# Patient Record
Sex: MALE | Race: WHITE | NOT HISPANIC OR LATINO | Employment: UNEMPLOYED | URBAN - METROPOLITAN AREA
[De-identification: names, ages, dates, MRNs, and addresses within clinical notes are randomized per-mention and may not be internally consistent; named-entity substitution may affect disease eponyms.]

---

## 2024-06-17 ENCOUNTER — TRANSFERRED RECORDS (OUTPATIENT)
Dept: HEALTH INFORMATION MANAGEMENT | Facility: CLINIC | Age: 44
End: 2024-06-17
Payer: COMMERCIAL

## 2024-06-22 ENCOUNTER — MEDICAL CORRESPONDENCE (OUTPATIENT)
Dept: HEALTH INFORMATION MANAGEMENT | Facility: CLINIC | Age: 44
End: 2024-06-22

## 2024-06-22 ENCOUNTER — APPOINTMENT (OUTPATIENT)
Dept: MRI IMAGING | Facility: CLINIC | Age: 44
End: 2024-06-22
Attending: EMERGENCY MEDICINE
Payer: COMMERCIAL

## 2024-06-22 ENCOUNTER — HOSPITAL ENCOUNTER (EMERGENCY)
Facility: CLINIC | Age: 44
Discharge: HOME | End: 2024-06-22
Attending: EMERGENCY MEDICINE | Admitting: EMERGENCY MEDICINE
Payer: COMMERCIAL

## 2024-06-22 VITALS
HEART RATE: 63 BPM | RESPIRATION RATE: 16 BRPM | WEIGHT: 250 LBS | SYSTOLIC BLOOD PRESSURE: 114 MMHG | TEMPERATURE: 97.8 F | BODY MASS INDEX: 33.86 KG/M2 | HEIGHT: 72 IN | DIASTOLIC BLOOD PRESSURE: 76 MMHG | OXYGEN SATURATION: 99 %

## 2024-06-22 DIAGNOSIS — H53.8 BLURRED VISION: ICD-10-CM

## 2024-06-22 DIAGNOSIS — H49.22 LEFT ABDUCENS NERVE PALSY: ICD-10-CM

## 2024-06-22 LAB
ANION GAP SERPL CALCULATED.3IONS-SCNC: 13 MMOL/L (ref 7–15)
BASOPHILS # BLD AUTO: 0.1 10E3/UL (ref 0–0.2)
BASOPHILS NFR BLD AUTO: 1 %
BUN SERPL-MCNC: 9.5 MG/DL (ref 6–20)
CALCIUM SERPL-MCNC: 9.6 MG/DL (ref 8.6–10)
CHLORIDE SERPL-SCNC: 104 MMOL/L (ref 98–107)
CREAT SERPL-MCNC: 0.92 MG/DL (ref 0.67–1.17)
DEPRECATED HCO3 PLAS-SCNC: 24 MMOL/L (ref 22–29)
EGFRCR SERPLBLD CKD-EPI 2021: >90 ML/MIN/1.73M2
EOSINOPHIL # BLD AUTO: 0.1 10E3/UL (ref 0–0.7)
EOSINOPHIL NFR BLD AUTO: 2 %
ERYTHROCYTE [DISTWIDTH] IN BLOOD BY AUTOMATED COUNT: 13.4 % (ref 10–15)
GLUCOSE SERPL-MCNC: 98 MG/DL (ref 70–99)
HCT VFR BLD AUTO: 46.3 % (ref 40–53)
HGB BLD-MCNC: 15.8 G/DL (ref 13.3–17.7)
IMM GRANULOCYTES # BLD: 0.1 10E3/UL
IMM GRANULOCYTES NFR BLD: 1 %
LYMPHOCYTES # BLD AUTO: 1.8 10E3/UL (ref 0.8–5.3)
LYMPHOCYTES NFR BLD AUTO: 23 %
MAGNESIUM SERPL-MCNC: 2.1 MG/DL (ref 1.7–2.3)
MCH RBC QN AUTO: 29.6 PG (ref 26.5–33)
MCHC RBC AUTO-ENTMCNC: 34.1 G/DL (ref 31.5–36.5)
MCV RBC AUTO: 87 FL (ref 78–100)
MONOCYTES # BLD AUTO: 0.6 10E3/UL (ref 0–1.3)
MONOCYTES NFR BLD AUTO: 8 %
NEUTROPHILS # BLD AUTO: 5.2 10E3/UL (ref 1.6–8.3)
NEUTROPHILS NFR BLD AUTO: 67 %
NRBC # BLD AUTO: 0 10E3/UL
NRBC BLD AUTO-RTO: 0 /100
PLATELET # BLD AUTO: 269 10E3/UL (ref 150–450)
POTASSIUM SERPL-SCNC: 4.2 MMOL/L (ref 3.4–5.3)
RBC # BLD AUTO: 5.34 10E6/UL (ref 4.4–5.9)
SODIUM SERPL-SCNC: 141 MMOL/L (ref 135–145)
WBC # BLD AUTO: 7.8 10E3/UL (ref 4–11)

## 2024-06-22 PROCEDURE — 255N000002 HC RX 255 OP 636: Performed by: EMERGENCY MEDICINE

## 2024-06-22 PROCEDURE — 62270 DX LMBR SPI PNXR: CPT

## 2024-06-22 PROCEDURE — 70553 MRI BRAIN STEM W/O & W/DYE: CPT

## 2024-06-22 PROCEDURE — 99285 EMERGENCY DEPT VISIT HI MDM: CPT | Mod: 25

## 2024-06-22 PROCEDURE — 96374 THER/PROPH/DIAG INJ IV PUSH: CPT | Mod: 59

## 2024-06-22 PROCEDURE — 85025 COMPLETE CBC W/AUTO DIFF WBC: CPT | Performed by: EMERGENCY MEDICINE

## 2024-06-22 PROCEDURE — 70544 MR ANGIOGRAPHY HEAD W/O DYE: CPT

## 2024-06-22 PROCEDURE — 83735 ASSAY OF MAGNESIUM: CPT | Performed by: EMERGENCY MEDICINE

## 2024-06-22 PROCEDURE — 80048 BASIC METABOLIC PNL TOTAL CA: CPT | Performed by: EMERGENCY MEDICINE

## 2024-06-22 PROCEDURE — A9585 GADOBUTROL INJECTION: HCPCS | Performed by: EMERGENCY MEDICINE

## 2024-06-22 PROCEDURE — 36415 COLL VENOUS BLD VENIPUNCTURE: CPT | Performed by: EMERGENCY MEDICINE

## 2024-06-22 PROCEDURE — 250N000011 HC RX IP 250 OP 636: Mod: JZ | Performed by: EMERGENCY MEDICINE

## 2024-06-22 PROCEDURE — 70549 MR ANGIOGRAPH NECK W/O&W/DYE: CPT

## 2024-06-22 RX ORDER — LORAZEPAM 2 MG/ML
1 INJECTION INTRAMUSCULAR ONCE
Status: COMPLETED | OUTPATIENT
Start: 2024-06-22 | End: 2024-06-22

## 2024-06-22 RX ORDER — LIDOCAINE HYDROCHLORIDE 10 MG/ML
INJECTION, SOLUTION EPIDURAL; INFILTRATION; INTRACAUDAL; PERINEURAL
Status: DISCONTINUED
Start: 2024-06-22 | End: 2024-06-23 | Stop reason: HOSPADM

## 2024-06-22 RX ORDER — GADOBUTROL 604.72 MG/ML
10 INJECTION INTRAVENOUS ONCE
Status: COMPLETED | OUTPATIENT
Start: 2024-06-22 | End: 2024-06-22

## 2024-06-22 RX ORDER — LIDOCAINE HYDROCHLORIDE AND EPINEPHRINE 10; 10 MG/ML; UG/ML
INJECTION, SOLUTION INFILTRATION; PERINEURAL
Status: DISCONTINUED
Start: 2024-06-22 | End: 2024-06-23 | Stop reason: HOSPADM

## 2024-06-22 RX ADMIN — LORAZEPAM 1 MG: 2 INJECTION INTRAMUSCULAR; INTRAVENOUS at 18:01

## 2024-06-22 RX ADMIN — GADOBUTROL 10 ML: 604.72 INJECTION INTRAVENOUS at 18:12

## 2024-06-22 ASSESSMENT — ACTIVITIES OF DAILY LIVING (ADL)
ADLS_ACUITY_SCORE: 35

## 2024-06-22 ASSESSMENT — COLUMBIA-SUICIDE SEVERITY RATING SCALE - C-SSRS
2. HAVE YOU ACTUALLY HAD ANY THOUGHTS OF KILLING YOURSELF IN THE PAST MONTH?: NO
6. HAVE YOU EVER DONE ANYTHING, STARTED TO DO ANYTHING, OR PREPARED TO DO ANYTHING TO END YOUR LIFE?: NO
1. IN THE PAST MONTH, HAVE YOU WISHED YOU WERE DEAD OR WISHED YOU COULD GO TO SLEEP AND NOT WAKE UP?: NO

## 2024-06-22 NOTE — ED TRIAGE NOTES
Pt presents for complaint of right eye blurred vision. Pt states vision is blurry only when gazing to the right for x2 weeks. States vision is otherwise normal. Describes some balance issues when looking to the right as well. Seen at optho clinic and sent in for evaluation. Eye tracking devolves with a right-ireland gaze.ABC intact, A&Ox4.     Triage Assessment (Adult)       Row Name 06/22/24 3990          Triage Assessment    Airway WDL WDL        Respiratory WDL    Respiratory WDL WDL        Skin Circulation/Temperature WDL    Skin Circulation/Temperature WDL WDL        Cardiac WDL    Cardiac WDL WDL        Peripheral/Neurovascular WDL    Peripheral Neurovascular WDL WDL        Cognitive/Neuro/Behavioral WDL    Cognitive/Neuro/Behavioral WDL WDL

## 2024-06-22 NOTE — ED PROVIDER NOTES
"Emergency Department Note      History of Present Illness     Chief Complaint:  Eye Problem      The history is provided by the patient and a relative.      Bill Gorman is a 43 year old male without any significant medical history who presents with blurry vision only when he looks to the right that started a few weeks ago. Both eyes seem to be affected. Otherwise vision is perceived as normal. He has also noted that for the last 2 weeks, when he looks to the left, he gets nauseated.    He saw an eye doctor today for the above symptoms. They checked his eye pressures and performed an eye exam. He was told to come to the ED.     He also notes a generalized HA for 2 months (initially intermittent). It always seemed worse in the AM and was associated with nausea. Some times Tylenol would help. Other times the HA would spontaneously improve. No recent F/C. No Hx HTN. He notes shortness of breath that alleviates on its own. No focal weakness, tachycardia, or feeling feverish but he mentioned that he had surgery on his right knee recently and perceived right leg \"instability\", but thinks that might he due to the surgery. He also perceives some left leg weakness over 1.5-2 months but denies feeling any leg weakness today. He denies any history of hypertension and is currently not taking any medications. Notably, he feels unbalanced when he walks up the stairs or looks to the right.     Independent Historian:    The patient and a relative present during examination endorses the information above.    Review of External Notes  Reviewed documents from [facility] in [place]. Concerned for 6th nerve palsy. Un-dilated eye exam, unremarkable. Referred to ED for further workup.     Past Medical History   Medical History, Surgical History, Problem List, and Medications  Reviewed in Epic    Physical Exam   Patient Vitals for the past 24 hrs:   BP Temp Pulse Resp SpO2 Height Weight   06/22/24 2200 -- -- -- -- 99 % -- -- "   06/22/24 2158 -- -- -- -- 99 % -- --   06/22/24 2157 -- -- -- -- 92 % -- --   06/22/24 2130 114/76 -- -- -- 95 % -- --   06/22/24 2100 101/59 -- 63 -- 98 % -- --   06/22/24 2030 124/85 -- 64 -- 97 % -- --   06/22/24 2000 128/84 -- 82 -- 99 % -- --   06/22/24 1946 -- -- -- -- 99 % -- --   06/22/24 1945 122/77 -- 67 -- 100 % -- --   06/22/24 1810 -- -- -- -- 100 % -- --   06/22/24 1805 -- -- -- -- 99 % -- --   06/22/24 1800 113/86 -- 50 -- 98 % -- --   06/22/24 1745 -- -- -- -- 98 % -- --   06/22/24 1730 -- -- -- -- 98 % -- --   06/22/24 1715 112/68 -- -- -- 99 % -- --   06/22/24 1708 -- -- -- -- 97 % -- --   06/22/24 1707 -- -- -- -- 99 % -- --   06/22/24 1706 -- -- -- -- 99 % -- --   06/22/24 1704 -- -- -- -- 97 % -- --   06/22/24 1703 -- -- -- -- 98 % -- --   06/22/24 1702 -- -- -- -- 99 % -- --   06/22/24 1640 -- -- -- -- 100 % -- --   06/22/24 1635 -- -- -- -- 99 % -- --   06/22/24 1630 -- -- -- -- 98 % -- --   06/22/24 1625 116/68 -- 51 -- 100 % -- --   06/22/24 1534 -- -- -- -- 100 % -- --   06/22/24 1532 -- -- -- -- 100 % -- --   06/22/24 1530 -- -- -- -- 100 % -- --   06/22/24 1529 -- -- -- -- 100 % -- --   06/22/24 1520 -- -- -- -- 99 % -- --   06/22/24 1510 -- -- -- -- 99 % -- --   06/22/24 1500 127/84 -- -- -- 100 % -- --   06/22/24 1416 (!) 163/95 97.8  F (36.6  C) 78 16 99 % 1.829 m (6') 113.4 kg (250 lb)       Physical Exam  Constitutional: Vital signs reviewed as above.   Eyes: PEERL, EOMI B/L  Neck: No JVD noted. FROM   Cardiovascular: normal rate, Regular rhythm and normal heart sounds.  No murmur heard. Equal B/L peripheral pulses.  Pulmonary/Chest: Effort normal and breath sounds normal. No respiratory distress. Patient has no wheezes. Patient has no rales.   Gastrointestinal: Soft. There is no tenderness.   Musculoskeletal/Extremities: No pitting edema noted. Normal tone.  Neurological:    No nystagmus noted  Patient is alert and oriented to person, place, and time.    Speech is fluent,  cognition is normal.   CN 2-12 intact (PERRL, EOMI, symmetric smile, equal eye squeeze and forehead raise, normal and equal sensation to bilateral forehead/cheek/chin, grossly equal hearing B/L, midline tongue protrusion with nl side-to-side movement, normal shoulder shrug).    RUE strength 5/5: , finger abd, wrist flex/ext, elbow flex/ext.    LUE strength 5/5: , finger abd, wrist flex/ext, elbow flex/ext.    RLE strength 5/5: ankle flex/ext, knee flex/ext, hip flex.    LLE strength 5/5: ankle flex/ext, knee flex/ext, hip flex.    Sensation equal in all 4 extremities.    No arm drift.     Cerebellar: Normal rapid alternating movements     ( finger-nose-finger, rapid pronation/supination, hand rolling)    Normal heel-to-shin   Skin: Skin is warm and dry. There is no diaphoresis noted.   Psychiatric: The patient appears calm.     Diagnostics     Laboratory: Imaging:   Labs Ordered and Resulted from Time of ED Arrival to Time of ED Departure   BASIC METABOLIC PANEL - Normal       Result Value    Sodium 141      Potassium 4.2      Chloride 104      Carbon Dioxide (CO2) 24      Anion Gap 13      Urea Nitrogen 9.5      Creatinine 0.92      GFR Estimate >90      Calcium 9.6      Glucose 98     MAGNESIUM - Normal    Magnesium 2.1     CBC WITH PLATELETS AND DIFFERENTIAL    WBC Count 7.8      RBC Count 5.34      Hemoglobin 15.8      Hematocrit 46.3      MCV 87      MCH 29.6      MCHC 34.1      RDW 13.4      Platelet Count 269      % Neutrophils 67      % Lymphocytes 23      % Monocytes 8      % Eosinophils 2      % Basophils 1      % Immature Granulocytes 1      NRBCs per 100 WBC 0      Absolute Neutrophils 5.2      Absolute Lymphocytes 1.8      Absolute Monocytes 0.6      Absolute Eosinophils 0.1      Absolute Basophils 0.1      Absolute Immature Granulocytes 0.1      Absolute NRBCs 0.0       MR Brain w/o & w Contrast   Final Result   IMPRESSION:   HEAD MRI:   1. No acute intracranial abnormality. No evidence of  acute infarct, hemorrhage, or mass.   2. Multifocal scattered nonspecific white matter foci of signal abnormality. Primary differential considerations are demyelinating disease versus chronic small vessel ischemic disease which should be advanced for the patient's age.      HEAD MRA:   Normal MRA [place].      NECK MRA:   Normal neck MRA.         MRA Angiogram Head w/o Contrast   Final Result   IMPRESSION:   HEAD MRI:   1. No acute intracranial abnormality. No evidence of acute infarct, hemorrhage, or mass.   2. Multifocal scattered nonspecific white matter foci of signal abnormality. Primary differential considerations are demyelinating disease versus chronic small vessel ischemic disease which should be advanced for the patient's age.      HEAD MRA:   Normal MRA [place].      NECK MRA:   Normal neck MRA.         MRA Angiogram Neck w/o & w Contrast   Final Result   IMPRESSION:   HEAD MRI:   1. No acute intracranial abnormality. No evidence of acute infarct, hemorrhage, or mass.   2. Multifocal scattered nonspecific white matter foci of signal abnormality. Primary differential considerations are demyelinating disease versus chronic small vessel ischemic disease which should be advanced for the patient's age.      HEAD MRA:   Normal MRA [place].      NECK MRA:   Normal neck MRA.                 Independent Interpretation  See ED course    ED Course    Medications Administered  Medications   lidocaine 1% with EPINEPHrine 1:100,000 1 %-1:[id number] injection (has no administration in time range)   lidocaine (PF) (XYLOCAINE) 1 % injection (has no administration in time range)   LORazepam (ATIVAN) injection 1 mg (1 mg Intravenous $Given 6/22/24 1801)   gadobutrol (GADAVIST) injection 10 mL (10 mLs Intravenous $Given 6/22/24 1812)   sodium chloride (PF) 0.9% PF flush 60 mL (60 mLs Intravenous $Given 6/22/24 1813)       Procedures  [facility]    -Lumbar Puncture    Date/Time:  6/23/2024 12:07 AM    Performed by: Fei Duarte DO  Authorized by: Fei Duarte DO    Risks, benefits and alternatives discussed.      UNIVERSAL PROTOCOL   Site Marked: NA  Prior Images Obtained and Reviewed:  NA  Required items: Required blood products, implants, devices and special equipment available    Patient identity confirmed:  Verbally with patient, arm band, provided demographic data and hospital-assigned identification number  NA - No sedation, light sedation, or local anesthesia  Confirmation Checklist:  Patient's identity using two indicators, relevant allergies, procedure was appropriate and matched the consent or emergent situation and correct equipment/implants were available  Time out: Immediately prior to the procedure a time out was called    Universal Protocol: the Joint Commission Universal Protocol was followed    Preparation: Patient was prepped and draped in usual sterile fashion      PRE-PROCEDURE DETAILS:     Procedure purpose:  Diagnostic    ANESTHESIA (see MAR for exact dosages):     Anesthesia method:  Local infiltration    Local anesthetic:  Lidocaine 1% WITH epi    PROCEDURE DETAILS:     Lumbar space:  L3-L4 interspace    Patient position:  L lateral decubitus    Needle gauge:  20    Needle type:  Spinal needle - Quincke tip    Needle length (in):  3.5    Ultrasound guidance: no      Number of attempts:  4    POST-PROCEDURE:     Puncture site:  Adhesive bandage applied      PROCEDURE  Describe Procedure: Despite multiple attempts, we were unable to obtain cerebrospinal fluid.  The attempt was aborted.  Patient Tolerance:  Patient tolerated the procedure well with no immediate complications       Discussion of Management  See ED Course    Social Determinants of Health adding to complexity of care  None    ED Course  ED Course as of 06/23/24 0008   Sat Jun 22, 2024   1527 Initial evaluation   1930 Rechecked and updated.   1938 D/W Dr. Watkins ([facility]  Neurology). Can look at doing LP.    1951 Rechecked and updated,.   1952 Rechecked and updated.   2023 Rechecked and updated.       Medical Decision Making / Diagnosis   CMS Diagnoses: None    Code Status: No Order    MIPS     None    Medical Decision Making:  This 43-year-old presents to the ED due to blurry vision and headache.  Please see the HPI and exam for specifics.  A broad differential was considered.  The patient's most notable physical exam abnormality was notably increased adduction of the left eye.  I think this misalignment is likely what is leading to the blurry vision he is perceiving.  Due to this as well as the couple months of headaches, MRI imaging was ordered.  Fortunately no brain mass nor stroke was seen.  There was some nonspecific findings that were noted.  After talking with neurology, consideration for idiopathic intracranial hypertension was given even though the patient has no systemic hypertension.  After obtaining informed consent, lumbar puncture was attempted but we were unsuccessful.  The attempt was aborted and I think we should have the patient follow-up with neurology in the outpatient setting.  He was comfortable with this plan and will follow-up as noted.  Referral paperwork sent to [facility], [facility].    Critical Care:  None.    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 Blurred vision  H53.8            Discharge Medications:  Discharge Medication List as of 6/22/2024 11:22 PM         6/22/2024   Fei Duarte DO     [employer]     Scribe Disclosure:  Fallon TREVINO, am serving as a scribe at 4:07 PM on 6/22/2024 to document services personally performed by Fei Duarte DO based on my observations and the provider's statements to me.     Scribe Disclosure:  Kerri TREVINO, am serving as the scribe  for Ivy  Magalys the scribe trainee, at 4:21 PM on 6/22/2024 to document services personally performed by Fei Duarte DO based on our observations and the provider's statements to us.                 Fei Duarte DO  06/23/24 0008

## 2024-06-23 NOTE — DISCHARGE INSTRUCTIONS
What do you do next:   Continue your home medications unless we have specifically changed them  The neurology clinic will contact you about outpatient follow-up.  You can call the ophthalmology clinic I have listed on the paperwork for follow-up as well  You can use over-the-counter acetaminophen (Tylenol ) and ibuprofen for fever or pain control as applicable to your visit today.  Acetaminophen (Tylenol): Take 500 to 1000 mg by mouth every 6 hours as needed for fever or pain.  Do not take more than 4000 total milligrams of acetaminophen-containing products in a 24-hour timeframe.  Ibuprofen: Take 600 milligrams by mouth every 6-8 hours as needed for fever or pain.  Take this with food or milk to avoid stomach upset.  Follow up as indicated below    When do you return: Review your discharge papers for specifics on reasons to return.    Thank you for allowing us to care for you today.

## 2025-07-29 ENCOUNTER — HOSPITAL ENCOUNTER (EMERGENCY)
Facility: CLINIC | Age: 45
Discharge: HOME | End: 2025-07-29
Attending: STUDENT IN AN ORGANIZED HEALTH CARE EDUCATION/TRAINING PROGRAM
Payer: COMMERCIAL

## 2025-07-29 ENCOUNTER — APPOINTMENT (OUTPATIENT)
Dept: GENERAL RADIOLOGY | Facility: CLINIC | Age: 45
End: 2025-07-29
Attending: STUDENT IN AN ORGANIZED HEALTH CARE EDUCATION/TRAINING PROGRAM
Payer: COMMERCIAL

## 2025-07-29 VITALS
WEIGHT: 208 LBS | HEART RATE: 62 BPM | OXYGEN SATURATION: 98 % | DIASTOLIC BLOOD PRESSURE: 72 MMHG | SYSTOLIC BLOOD PRESSURE: 131 MMHG | TEMPERATURE: 97.6 F | HEIGHT: 72 IN | BODY MASS INDEX: 28.17 KG/M2 | RESPIRATION RATE: 16 BRPM

## 2025-07-29 DIAGNOSIS — R20.2 PARESTHESIAS: ICD-10-CM

## 2025-07-29 DIAGNOSIS — F12.90 MARIJUANA USE: ICD-10-CM

## 2025-07-29 DIAGNOSIS — F17.200 TOBACCO USE DISORDER: ICD-10-CM

## 2025-07-29 DIAGNOSIS — R06.02 SHORTNESS OF BREATH: Primary | ICD-10-CM

## 2025-07-29 PROBLEM — F40.10 SOCIAL ANXIETY DISORDER: Status: ACTIVE | Noted: 2023-12-22

## 2025-07-29 LAB
ANION GAP SERPL CALCULATED.3IONS-SCNC: 20 MMOL/L (ref 7–15)
BASOPHILS # BLD AUTO: 0 10E3/UL (ref 0–0.2)
BASOPHILS NFR BLD AUTO: 0 %
BUN SERPL-MCNC: 14.1 MG/DL (ref 6–20)
CALCIUM SERPL-MCNC: 9.4 MG/DL (ref 8.8–10.4)
CHLORIDE SERPL-SCNC: 98 MMOL/L (ref 98–107)
CREAT SERPL-MCNC: 0.83 MG/DL (ref 0.67–1.17)
D DIMER PPP FEU-MCNC: <0.27 UG/ML FEU (ref 0–0.5)
EGFRCR SERPLBLD CKD-EPI 2021: >90 ML/MIN/1.73M2
EOSINOPHIL # BLD AUTO: 0.1 10E3/UL (ref 0–0.7)
EOSINOPHIL NFR BLD AUTO: 2 %
ERYTHROCYTE [DISTWIDTH] IN BLOOD BY AUTOMATED COUNT: 14.2 % (ref 10–15)
GLUCOSE SERPL-MCNC: 99 MG/DL (ref 70–99)
HCO3 SERPL-SCNC: 19 MMOL/L (ref 22–29)
HCT VFR BLD AUTO: 42.4 % (ref 40–53)
HGB BLD-MCNC: 14.4 G/DL (ref 13.3–17.7)
HOLD SPECIMEN: NORMAL
IMM GRANULOCYTES # BLD: 0 10E3/UL
IMM GRANULOCYTES NFR BLD: 1 %
LYMPHOCYTES # BLD AUTO: 1.4 10E3/UL (ref 0.8–5.3)
LYMPHOCYTES NFR BLD AUTO: 33 %
MCH RBC QN AUTO: 30.1 PG (ref 26.5–33)
MCHC RBC AUTO-ENTMCNC: 34 G/DL (ref 31.5–36.5)
MCV RBC AUTO: 89 FL (ref 78–100)
MONOCYTES # BLD AUTO: 0.4 10E3/UL (ref 0–1.3)
MONOCYTES NFR BLD AUTO: 10 %
NEUTROPHILS # BLD AUTO: 2.2 10E3/UL (ref 1.6–8.3)
NEUTROPHILS NFR BLD AUTO: 54 %
NRBC # BLD AUTO: 0 10E3/UL
NRBC BLD AUTO-RTO: 0 /100
PLATELET # BLD AUTO: 238 10E3/UL (ref 150–450)
POTASSIUM SERPL-SCNC: 3.6 MMOL/L (ref 3.4–5.3)
RBC # BLD AUTO: 4.79 10E6/UL (ref 4.4–5.9)
SODIUM SERPL-SCNC: 137 MMOL/L (ref 135–145)
TROPONIN T SERPL HS-MCNC: <6 NG/L
WBC # BLD AUTO: 4.1 10E3/UL (ref 4–11)

## 2025-07-29 PROCEDURE — 93005 ELECTROCARDIOGRAM TRACING: CPT

## 2025-07-29 PROCEDURE — 36415 COLL VENOUS BLD VENIPUNCTURE: CPT | Performed by: STUDENT IN AN ORGANIZED HEALTH CARE EDUCATION/TRAINING PROGRAM

## 2025-07-29 PROCEDURE — 99285 EMERGENCY DEPT VISIT HI MDM: CPT | Mod: 25 | Performed by: STUDENT IN AN ORGANIZED HEALTH CARE EDUCATION/TRAINING PROGRAM

## 2025-07-29 PROCEDURE — 85025 COMPLETE CBC W/AUTO DIFF WBC: CPT | Performed by: STUDENT IN AN ORGANIZED HEALTH CARE EDUCATION/TRAINING PROGRAM

## 2025-07-29 PROCEDURE — 80048 BASIC METABOLIC PNL TOTAL CA: CPT | Performed by: STUDENT IN AN ORGANIZED HEALTH CARE EDUCATION/TRAINING PROGRAM

## 2025-07-29 PROCEDURE — 71046 X-RAY EXAM CHEST 2 VIEWS: CPT

## 2025-07-29 PROCEDURE — 84484 ASSAY OF TROPONIN QUANT: CPT | Performed by: STUDENT IN AN ORGANIZED HEALTH CARE EDUCATION/TRAINING PROGRAM

## 2025-07-29 PROCEDURE — 85379 FIBRIN DEGRADATION QUANT: CPT | Performed by: STUDENT IN AN ORGANIZED HEALTH CARE EDUCATION/TRAINING PROGRAM

## 2025-07-29 ASSESSMENT — ACTIVITIES OF DAILY LIVING (ADL)
ADLS_ACUITY_SCORE: 41

## 2025-07-30 LAB
ATRIAL RATE - MUSE: 89 BPM
DIASTOLIC BLOOD PRESSURE - MUSE: NORMAL MMHG
INTERPRETATION ECG - MUSE: NORMAL
P AXIS - MUSE: 62 DEGREES
PR INTERVAL - MUSE: 154 MS
QRS DURATION - MUSE: 98 MS
QT - MUSE: 374 MS
QTC - MUSE: 455 MS
R AXIS - MUSE: -29 DEGREES
SYSTOLIC BLOOD PRESSURE - MUSE: NORMAL MMHG
T AXIS - MUSE: 65 DEGREES
VENTRICULAR RATE- MUSE: 89 BPM

## 2025-07-30 NOTE — ED PROVIDER NOTES
Emergency Department Note      History of Present Illness     Chief Complaint   Shortness of Breath      HPI   Bill Gorman is a very pleasant 45 year old male with anxiety disorder presenting with shortness of breath. The patient reports that around 1800, he felt as if he couldn't catch his breath, and panicked a bit. He endorses feeling nausea and weakness in his legs during this. The patient and his girlfriend had walked a mile before this occurred. Around 2030, he describes the symptoms getting much better. The patient notes having had a history of tingling in both his legs as well as episodes of shortness of breath. He endorses smoking weed and having anxiety. He denies chest pain, vomiting, coughing, nausea, or pain anywhere else. Additionally he has no history of blood clots, travel, heart problems, or asthma. He mentions feeling anxious these last few days and has been prescribed medication for it. He has been dealing with intermittent bilateral lower extremity paresthesias for more than 1 year.     Independent Historian   Girlfriend as detailed above.    Review of External Notes   I reviewed progress note from 7/7/25, to understand patient's baseline medical problems    Past Medical History     Medical History and Problem List   Anxiety disorder NOS  Adjustment disorder with mixed anxiety and depressed mood  Cannabis abuse  Dysthymia  Social anxiety disorder    Medications   Wellbutrin XL  Buspar    Surgical History   (IA) Surgical Procedure    Physical Exam     Patient Vitals for the past 24 hrs:   BP Temp Pulse Resp SpO2 Height Weight   07/29/25 2305 131/72 -- 62 -- 98 % -- --   07/29/25 2200 -- -- (!) 48 16 97 % -- --   07/29/25 2145 112/71 -- 54 13 98 % -- --   07/29/25 2130 126/66 -- 53 -- 96 % -- --   07/29/25 2115 120/57 -- 65 -- 98 % -- --   07/29/25 2100 127/83 -- 60 15 97 % -- --   07/29/25 2045 -- -- 78 12 99 % -- --   07/29/25 2030 117/67 -- 60 -- 99 % -- --   07/29/25 2020 135/74 -- 68 13 100  % -- --   07/29/25 2015 136/70 -- 63 10 100 % -- --   07/29/25 2003 121/88 97.6  F (36.4  C) 98 22 100 % 1.829 m (6') 94.3 kg (208 lb)     Physical Exam  Vitals and nursing note reviewed.   Constitutional:       Appearance: He is well-developed. He is not ill-appearing or diaphoretic.   HENT:      Head: Atraumatic.   Cardiovascular:      Rate and Rhythm: Normal rate and regular rhythm.      Heart sounds: Normal heart sounds.   Pulmonary:      Effort: Pulmonary effort is normal. No tachypnea, accessory muscle usage or respiratory distress.      Breath sounds: No wheezing, rhonchi or rales.   Musculoskeletal:      Cervical back: Normal range of motion and neck supple.      Right lower leg: No tenderness. No edema.      Left lower leg: No tenderness. No edema.   Skin:     General: Skin is warm and dry.   Neurological:      General: No focal deficit present.      Mental Status: He is alert and oriented to person, place, and time.      Motor: No weakness.           Diagnostics     Lab Results   Labs Ordered and Resulted from Time of ED Arrival to Time of ED Departure   BASIC METABOLIC PANEL - Abnormal       Result Value    Sodium 137      Potassium 3.6      Chloride 98      Carbon Dioxide (CO2) 19 (*)     Anion Gap 20 (*)     Urea Nitrogen 14.1      Creatinine 0.83      GFR Estimate >90      Calcium 9.4      Glucose 99     TROPONIN T, HIGH SENSITIVITY - Normal    Troponin T, High Sensitivity <6     D DIMER QUANTITATIVE - Normal    D-Dimer Quantitative <0.27     CBC WITH PLATELETS AND DIFFERENTIAL    WBC Count 4.1      RBC Count 4.79      Hemoglobin 14.4      Hematocrit 42.4      MCV 89      MCH 30.1      MCHC 34.0      RDW 14.2      Platelet Count 238      % Neutrophils 54      % Lymphocytes 33      % Monocytes 10      % Eosinophils 2      % Basophils 0      % Immature Granulocytes 1      NRBCs per 100 WBC 0      Absolute Neutrophils 2.2      Absolute Lymphocytes 1.4      Absolute Monocytes 0.4      Absolute Eosinophils  0.1      Absolute Basophils 0.0      Absolute Immature Granulocytes 0.0      Absolute NRBCs 0.0         Imaging   XR Chest 2 Views   Final Result   IMPRESSION:       Diffuse interstitial opacities, which may represent multifocal atypical infection, interstitial edema, or chronic interstitial scarring.      No focal airspace consolidation. No pleural effusion or pneumothorax.      The cardiomediastinal silhouette is unremarkable.          EKG   ECG taken at 1955, ECG read at 1955  Normal sinus rhythm  Low voltage QRS  Cannot rule out Anterior infarct, age undetermined  Abnormal ECG     Rate 89 bpm. NC interval 154 ms. QRS duration 98 ms. QT/QTc 374/455 ms. P-R-T axes 62 -29 65.     Independent Interpretation   CXR: No pneumothorax or infiltrate.    ED Course      Medications Administered   Medications - No data to display    Procedures   Procedures   None performed    Discussion of Management   None    ED Course   ED Course as of 07/29/25 2320 Tue Jul 29, 2025 2044 I obtained history and examined the patient as noted above         Additional Documentation  None    Medical Decision Making / Diagnosis     CMS Diagnoses: None    MIPS   None               MDM   Bill Gorman is a 45 year old male presenting with shortness of breath. Vital signs are reassuring. Considered differential including pulmonary embolism, spontaneous pneumothorax, acute coronary syndrome, among others.  Low suspicion for acute aortic pathology given patient has no pain at all.  Also low suspicion for infectious pathology given quick onset of symptoms and rapid resolution, with no fever, persistent cough.  Workup here appears reassuring.  EKG demonstrates no acute appearing ischemic changes.  Troponin is undetectable, reassuring against acute myocardial injury.  Patient is low risk by Wells score for PE, 0 points.  D-dimer is within normal limits, reassuring against pulmonary embolism.  Chest x-ray demonstrates no pneumothorax.  There some  scattered interstitial opacities, uncertain what these represent, but in the absence of fever, persistent cough, leukocytosis, I would low suspicion for an atypical infection at this time.  Patient had no recurrence of symptoms after presentation to the emergency department and was observed here for more than 3 hours.  With reassuring workup, recommended he follow-up with his primary care clinician within the next week for reevaluation.  I counseled the patient to try to stop smoking and also reduce or stop marijuana use.  Patient does report having bilateral lower extremity paresthesias intermittently, and these been going on for more than 1 year.  Do not feel they require emergent workup in the emergency department.  I recommended he discuss with his primary care clinician again and consider further workup as indicated.  Additional verbal instructions were provided.  Return precautions were provided.        Disposition   The patient was discharged.     Diagnosis     ICD-10-CM    1. Shortness of breath  R06.02       2. Tobacco use disorder  F17.200       3. Marijuana use  F12.90       4. Paresthesias  R20.2            Discharge Medications   Discharge Medication List as of 7/29/2025 11:01 PM            Scribe Disclosure:  I, Yimi Winn, am serving as a scribe at 8:57 PM on 7/29/2025 to document services personally performed by Alexy Arriaga MD based on my observations and the provider's statements to me.        Alexy Arriaga MD  07/29/25 4236

## 2025-07-30 NOTE — ED TRIAGE NOTES
Pt presents to triage with c/o shortness of breath. Came on suddenly while eating dinner. Pt also with dizziness, SARA leg numbness, diaphoretic.

## 2025-07-30 NOTE — DISCHARGE INSTRUCTIONS
Thank you for allowing us to evaluate you today.  Follow up with primary care clinician  in 1 week for reevaluation..    Please read the guidance provided with your discharge instructions.  Immediately return to the emergency department with any concerns.

## 2025-08-01 ENCOUNTER — APPOINTMENT (OUTPATIENT)
Dept: CT IMAGING | Facility: CLINIC | Age: 45
End: 2025-08-01
Attending: EMERGENCY MEDICINE
Payer: COMMERCIAL

## 2025-08-01 ENCOUNTER — APPOINTMENT (OUTPATIENT)
Dept: GENERAL RADIOLOGY | Facility: CLINIC | Age: 45
End: 2025-08-01
Attending: EMERGENCY MEDICINE
Payer: COMMERCIAL

## 2025-08-01 PROCEDURE — 71260 CT THORAX DX C+: CPT

## 2025-08-01 PROCEDURE — 71046 X-RAY EXAM CHEST 2 VIEWS: CPT
